# Patient Record
Sex: FEMALE | Race: WHITE | Employment: UNEMPLOYED | URBAN - METROPOLITAN AREA
[De-identification: names, ages, dates, MRNs, and addresses within clinical notes are randomized per-mention and may not be internally consistent; named-entity substitution may affect disease eponyms.]

---
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
## 2022-01-06 ENCOUNTER — OFFICE VISIT (OUTPATIENT)
Dept: PRIMARY CARE CLINIC | Age: 22
End: 2022-01-06
Payer: COMMERCIAL

## 2022-01-06 VITALS
HEART RATE: 63 BPM | SYSTOLIC BLOOD PRESSURE: 113 MMHG | RESPIRATION RATE: 16 BRPM | TEMPERATURE: 97.4 F | HEIGHT: 63 IN | WEIGHT: 145.2 LBS | BODY MASS INDEX: 25.73 KG/M2 | DIASTOLIC BLOOD PRESSURE: 80 MMHG | OXYGEN SATURATION: 98 %

## 2022-01-06 DIAGNOSIS — R53.83 MALAISE AND FATIGUE: ICD-10-CM

## 2022-01-06 DIAGNOSIS — L08.9 INFECTED ABRASION OF SKIN OF LEFT LOWER LEG: Primary | ICD-10-CM

## 2022-01-06 DIAGNOSIS — R53.81 MALAISE AND FATIGUE: ICD-10-CM

## 2022-01-06 DIAGNOSIS — S80.812A INFECTED ABRASION OF SKIN OF LEFT LOWER LEG: Primary | ICD-10-CM

## 2022-01-06 PROCEDURE — 99203 OFFICE O/P NEW LOW 30 MIN: CPT | Performed by: NURSE PRACTITIONER

## 2022-01-06 RX ORDER — CEPHALEXIN 500 MG/1
500 CAPSULE ORAL 4 TIMES DAILY
Qty: 28 CAPSULE | Refills: 0 | Status: SHIPPED | OUTPATIENT
Start: 2022-01-06 | End: 2022-01-13

## 2022-01-07 NOTE — PROGRESS NOTES
Subjective:      Ivory Hester is an 24 y.o. female who presents for evaluation of a probable skin infection located on the left lower leg. Onset of symptoms was gradual, with gradually worsening since that time. Symptoms include erythema, tenderness, pain and drainage. Patient denies  fever, chills, nausea and vomiting. There is a history trauma to the area. Skin was rubbed by ice skate 7 days ago. Treatment to date has included neosporin with no relief. Not feeling well. C/o headache, malaise and fatigue. Denies hx MRSA. Allergies   Allergen Reactions    Shellfish Containing Products Anaphylaxis    Iodine Swelling    Shellfish Derived Swelling     History reviewed. No pertinent past medical history. Past Surgical History:   Procedure Laterality Date    HX APPENDECTOMY      10/29/2013        Objective:     Visit Vitals  /80   Pulse 63   Temp 97.4 °F (36.3 °C)   Resp 16   Ht 5' 3\" (1.6 m)   Wt 145 lb 3.2 oz (65.9 kg)   LMP 12/23/2021 (Approximate)   SpO2 98%   BMI 25.72 kg/m²     General appearance: alert, well appearing, and in no distress. Skin exam: erythema and tenderness to skin abrasion approx 1.5-2cm in diameter noted on the lower lateral leg/ankle. No surrounding cellulitis or streaking. Assessment/Plan:       ICD-10-CM ICD-9-CM    1. Infected abrasion of skin of left lower leg  S80.812A 916.1 NOVEL CORONAVIRUS (COVID-19)    L08.9  CULTURE, ANAEROBIC AND AEROBIC   2. Malaise and fatigue  R53.81 780.79 CULTURE, ANAEROBIC AND AEROBIC    R53.83        as described  I do not believe this to be a high risk lesion for MRSA. Thus I am treating the patient with keflex. Keflex prescribed. Reviewed wound care. Declined updating tetanus vaccine today. Follow-up prn.     Micah Reddy NP

## 2022-01-07 NOTE — PROGRESS NOTES
Chief Complaint   Patient presents with    Laceration     Patient in room #4 with complaint of cut left leg a week ago, noted redness, swelling, painful, greenish yellow drainage

## 2022-01-07 NOTE — PATIENT INSTRUCTIONS
Scrapes (Abrasions): Care Instructions  Your Care Instructions  Scrapes (abrasions) are wounds where your skin has been rubbed or torn off. Most scrapes do not go deep into the skin, but some may remove several layers of skin. Scrapes usually don't bleed much, but they may ooze pinkish fluid. Scrapes on the head or face may appear worse than they are. They may bleed a lot because of the good blood supply to this area. Most scrapes heal well and may not need a bandage. They usually heal within 3 to 7 days. A large, deep scrape may take 1 to 2 weeks or longer to heal. A scab may form on some scrapes. Follow-up care is a key part of your treatment and safety. Be sure to make and go to all appointments, and call your doctor if you are having problems. It's also a good idea to know your test results and keep a list of the medicines you take. How can you care for yourself at home? · If your doctor told you how to care for your wound, follow your doctor's instructions. If you did not get instructions, follow this general advice:  ? Wash the scrape with clean water 2 times a day. Don't use hydrogen peroxide or alcohol, which can slow healing. ? You may cover the scrape with a thin layer of petroleum jelly, such as Vaseline, and a nonstick bandage. ? Apply more petroleum jelly and replace the bandage as needed. · Prop up the injured area on a pillow anytime you sit or lie down during the next 3 days. Try to keep it above the level of your heart. This will help reduce swelling. · Be safe with medicines. Take pain medicines exactly as directed. ? If the doctor gave you a prescription medicine for pain, take it as prescribed. ? If you are not taking a prescription pain medicine, ask your doctor if you can take an over-the-counter medicine. When should you call for help?    Call your doctor now or seek immediate medical care if:    · You have signs of infection, such as:  ? Increased pain, swelling, warmth, or redness around the scrape. ? Red streaks leading from the scrape. ? Pus draining from the scrape. ? A fever.     · The scrape starts to bleed, and blood soaks through the bandage. Oozing small amounts of blood is normal.   Watch closely for changes in your health, and be sure to contact your doctor if the scrape is not getting better each day. Where can you learn more? Go to http://www.gray.com/  Enter A374 in the search box to learn more about \"Scrapes (Abrasions): Care Instructions. \"  Current as of: July 1, 2021               Content Version: 13.0  © 5718-3892 [employer]. Care instructions adapted under license by [employer] (which disclaims liability or warranty for this information). If you have questions about a medical condition or this instruction, always ask your healthcare professional. [place] [address] any warranty or liability for your use of this information.

## 2022-01-11 ENCOUNTER — TELEPHONE (OUTPATIENT)
Dept: PRIMARY CARE CLINIC | Age: 22
End: 2022-01-11

## 2022-01-11 LAB
BACTERIA SPEC AEROBE CULT: ABNORMAL
BACTERIA SPEC ANAEROBE CULT: ABNORMAL
SARS-COV-2, NAA: NOT DETECTED

## 2022-01-11 NOTE — PROGRESS NOTES
Culture grew strep. Treated appropriately with keflex. Attempted to reach pt by phone, no answer, mailbox is full.

## 2022-01-11 NOTE — TELEPHONE ENCOUNTER
Wound culture grew strep. Appropriately treated with keflex. Attempted to reach pt by phone, no answer, mailbox full.